# Patient Record
Sex: MALE | Race: WHITE | NOT HISPANIC OR LATINO
[De-identification: names, ages, dates, MRNs, and addresses within clinical notes are randomized per-mention and may not be internally consistent; named-entity substitution may affect disease eponyms.]

---

## 2023-04-20 PROBLEM — Z00.00 ENCOUNTER FOR PREVENTIVE HEALTH EXAMINATION: Status: ACTIVE | Noted: 2023-04-20

## 2023-04-21 ENCOUNTER — APPOINTMENT (OUTPATIENT)
Dept: UROLOGY | Facility: CLINIC | Age: 38
End: 2023-04-21
Payer: COMMERCIAL

## 2023-04-21 ENCOUNTER — RESULT CHARGE (OUTPATIENT)
Age: 38
End: 2023-04-21

## 2023-04-21 VITALS
SYSTOLIC BLOOD PRESSURE: 136 MMHG | OXYGEN SATURATION: 99 % | BODY MASS INDEX: 30.21 KG/M2 | WEIGHT: 211 LBS | DIASTOLIC BLOOD PRESSURE: 62 MMHG | TEMPERATURE: 96.4 F | HEIGHT: 70 IN | HEART RATE: 66 BPM

## 2023-04-21 DIAGNOSIS — N20.1 CALCULUS OF URETER: ICD-10-CM

## 2023-04-21 LAB
BILIRUB UR QL STRIP: NEGATIVE
CLARITY UR: CLEAR
COLLECTION METHOD: NORMAL
GLUCOSE UR-MCNC: NEGATIVE
HCG UR QL: 0.2 EU/DL
HGB UR QL STRIP.AUTO: NORMAL
KETONES UR-MCNC: NEGATIVE
LEUKOCYTE ESTERASE UR QL STRIP: NEGATIVE
NITRITE UR QL STRIP: NEGATIVE
PH UR STRIP: 6
PROT UR STRIP-MCNC: NEGATIVE
SP GR UR STRIP: 1.02

## 2023-04-21 PROCEDURE — 99203 OFFICE O/P NEW LOW 30 MIN: CPT

## 2023-04-21 NOTE — HISTORY OF PRESENT ILLNESS
[FreeTextEntry1] : 37 yo male, recent right flank pain\par went to [facility] ER\par CT abdomen: There is mild right renal pelvic fullness related to a a 4 mm stone in the right distal ureter adjacent to the ureterovesical junction.

## 2023-04-21 NOTE — ASSESSMENT
[FreeTextEntry1] : for the time being would like conservative management\par \par will wait\par if recurrent right flank pain, fever chills otherwise\par \par will come to ER for stenting and URS

## 2024-12-10 ENCOUNTER — NON-APPOINTMENT (OUTPATIENT)
Age: 39
End: 2024-12-10